# Patient Record
Sex: MALE | Race: WHITE | NOT HISPANIC OR LATINO | URBAN - METROPOLITAN AREA
[De-identification: names, ages, dates, MRNs, and addresses within clinical notes are randomized per-mention and may not be internally consistent; named-entity substitution may affect disease eponyms.]

---

## 2017-11-16 ENCOUNTER — OUTPATIENT (OUTPATIENT)
Dept: OUTPATIENT SERVICES | Facility: HOSPITAL | Age: 72
LOS: 1 days | Discharge: HOME | End: 2017-11-16

## 2017-11-16 DIAGNOSIS — Z79.01 LONG TERM (CURRENT) USE OF ANTICOAGULANTS: ICD-10-CM

## 2017-11-20 ENCOUNTER — OUTPATIENT (OUTPATIENT)
Dept: OUTPATIENT SERVICES | Facility: HOSPITAL | Age: 72
LOS: 1 days | Discharge: HOME | End: 2017-11-20

## 2017-11-20 DIAGNOSIS — Z79.01 LONG TERM (CURRENT) USE OF ANTICOAGULANTS: ICD-10-CM

## 2017-11-27 ENCOUNTER — OUTPATIENT (OUTPATIENT)
Dept: OUTPATIENT SERVICES | Facility: HOSPITAL | Age: 72
LOS: 1 days | Discharge: HOME | End: 2017-11-27

## 2017-11-27 DIAGNOSIS — Z79.01 LONG TERM (CURRENT) USE OF ANTICOAGULANTS: ICD-10-CM

## 2018-05-14 ENCOUNTER — OUTPATIENT (OUTPATIENT)
Dept: OUTPATIENT SERVICES | Facility: HOSPITAL | Age: 73
LOS: 1 days | Discharge: HOME | End: 2018-05-14

## 2018-05-14 DIAGNOSIS — I10 ESSENTIAL (PRIMARY) HYPERTENSION: ICD-10-CM

## 2018-06-03 ENCOUNTER — TRANSCRIPTION ENCOUNTER (OUTPATIENT)
Age: 73
End: 2018-06-03

## 2018-07-09 ENCOUNTER — OUTPATIENT (OUTPATIENT)
Dept: OUTPATIENT SERVICES | Facility: HOSPITAL | Age: 73
LOS: 1 days | Discharge: HOME | End: 2018-07-09

## 2018-07-09 DIAGNOSIS — E11.9 TYPE 2 DIABETES MELLITUS WITHOUT COMPLICATIONS: ICD-10-CM

## 2018-07-09 DIAGNOSIS — E78.3 HYPERCHYLOMICRONEMIA: ICD-10-CM

## 2018-07-09 DIAGNOSIS — I10 ESSENTIAL (PRIMARY) HYPERTENSION: ICD-10-CM

## 2022-07-19 ENCOUNTER — OUTPATIENT (OUTPATIENT)
Dept: OUTPATIENT SERVICES | Facility: HOSPITAL | Age: 77
LOS: 1 days | Discharge: HOME | End: 2022-07-19

## 2022-07-19 DIAGNOSIS — R07.9 CHEST PAIN, UNSPECIFIED: ICD-10-CM

## 2022-07-19 PROCEDURE — 75574 CT ANGIO HRT W/3D IMAGE: CPT | Mod: 26,MH

## 2022-08-11 ENCOUNTER — OUTPATIENT (OUTPATIENT)
Dept: OUTPATIENT SERVICES | Facility: HOSPITAL | Age: 77
LOS: 1 days | Discharge: HOME | End: 2022-08-11

## 2022-08-11 DIAGNOSIS — Z96.652 PRESENCE OF LEFT ARTIFICIAL KNEE JOINT: Chronic | ICD-10-CM

## 2022-08-11 DIAGNOSIS — Z98.49 CATARACT EXTRACTION STATUS, UNSPECIFIED EYE: Chronic | ICD-10-CM

## 2022-08-11 LAB
ANION GAP SERPL CALC-SCNC: 13 MMOL/L — SIGNIFICANT CHANGE UP (ref 7–14)
BUN SERPL-MCNC: 23 MG/DL — HIGH (ref 10–20)
CALCIUM SERPL-MCNC: 9.5 MG/DL — SIGNIFICANT CHANGE UP (ref 8.5–10.1)
CHLORIDE SERPL-SCNC: 108 MMOL/L — SIGNIFICANT CHANGE UP (ref 98–110)
CO2 SERPL-SCNC: 26 MMOL/L — SIGNIFICANT CHANGE UP (ref 17–32)
CREAT SERPL-MCNC: 1.4 MG/DL — SIGNIFICANT CHANGE UP (ref 0.7–1.5)
EGFR: 52 ML/MIN/1.73M2 — LOW
GLUCOSE SERPL-MCNC: 119 MG/DL — HIGH (ref 70–99)
HCT VFR BLD CALC: 41.4 % — LOW (ref 42–52)
HGB BLD-MCNC: 14.6 G/DL — SIGNIFICANT CHANGE UP (ref 14–18)
MCHC RBC-ENTMCNC: 33.6 PG — HIGH (ref 27–31)
MCHC RBC-ENTMCNC: 35.3 G/DL — SIGNIFICANT CHANGE UP (ref 32–37)
MCV RBC AUTO: 95.4 FL — HIGH (ref 80–94)
NRBC # BLD: 0 /100 WBCS — SIGNIFICANT CHANGE UP (ref 0–0)
PLATELET # BLD AUTO: 201 K/UL — SIGNIFICANT CHANGE UP (ref 130–400)
POTASSIUM SERPL-MCNC: 4.3 MMOL/L — SIGNIFICANT CHANGE UP (ref 3.5–5)
POTASSIUM SERPL-SCNC: 4.3 MMOL/L — SIGNIFICANT CHANGE UP (ref 3.5–5)
RBC # BLD: 4.34 M/UL — LOW (ref 4.7–6.1)
RBC # FLD: 12.7 % — SIGNIFICANT CHANGE UP (ref 11.5–14.5)
SODIUM SERPL-SCNC: 147 MMOL/L — HIGH (ref 135–146)
WBC # BLD: 4.31 K/UL — LOW (ref 4.8–10.8)
WBC # FLD AUTO: 4.31 K/UL — LOW (ref 4.8–10.8)

## 2022-08-11 PROCEDURE — 93458 L HRT ARTERY/VENTRICLE ANGIO: CPT | Mod: 26

## 2022-08-11 RX ORDER — METOPROLOL TARTRATE 50 MG
1 TABLET ORAL
Qty: 0 | Refills: 0 | DISCHARGE

## 2022-08-11 RX ORDER — ASPIRIN/CALCIUM CARB/MAGNESIUM 324 MG
1 TABLET ORAL
Qty: 0 | Refills: 0 | DISCHARGE

## 2022-08-11 RX ORDER — AMLODIPINE BESYLATE 2.5 MG/1
1 TABLET ORAL
Qty: 0 | Refills: 0 | DISCHARGE

## 2022-08-11 RX ORDER — FOSINOPRIL SODIUM 10 MG/1
1 TABLET ORAL
Qty: 0 | Refills: 0 | DISCHARGE

## 2022-08-11 RX ORDER — DUTASTERIDE AND TAMSULOSIN HYDROCHLORIDE CAPSULES .5; .4 MG/1; MG/1
1 CAPSULE ORAL
Qty: 0 | Refills: 0 | DISCHARGE

## 2022-08-11 RX ORDER — RANOLAZINE 500 MG/1
1 TABLET, FILM COATED, EXTENDED RELEASE ORAL
Qty: 0 | Refills: 0 | DISCHARGE

## 2022-08-11 NOTE — H&P CARDIOLOGY - NSICDXFAMILYHX_GEN_ALL_CORE_FT
FAMILY HISTORY:  Father  Still living? Unknown  FH: myocardial infarction, Age at diagnosis: Age Unknown    Grandparent  Still living? Unknown  FH: myocardial infarction, Age at diagnosis: Age Unknown

## 2022-08-11 NOTE — CHART NOTE - NSCHARTNOTEFT_GEN_A_CORE
PRE-OP DIAGNOSIS:    Stable Angina. Abnormal CCTA    PROCEDURE:     [x] Coronary Angiogram     [x] LHC     [] LVG     [] RHC     [] Intervention (see below)         PHYSICIAN:  Dr Mello    ASSISTANT:  Dr BELLA Rashid       PROCEDURE DESCRIPTION:     Consent:      [x] Patient     [] Family Member     []  Used        Anesthesia:     [] General     [x] Sedation     [x] Local        Access & Closure:     [x] 6 Fr Right Radial Artery (D stat closure)    [] Fr Femoral Artery     [] Fr Femoral Vein     [] Fr Brachial Vein       IV Contrast: 50 mL        Intervention: None      Implants: None       FINDINGS:     The coronary circulation is right dominant    Left main: minor luminal irregularities    LAD: Prox LAD 30 % stenosis   D1: minor luminal irregularities    Cx: minor luminal irregularities  OM3: 40 % stenosis     RCA: minor luminal irregularities  RPL: showed minor luminal irregularities with no flow limiting lesions. 2nd posterolateral RPL2 40 % stenosis     LVEDP: 4 mmHg      ESTIMATED BLOOD LOSS: < 10 mL        CONDITION:     [x] Good     [] Fair     [] Critical        SPECIMEN REMOVED: N/A       POST-OP DIAGNOSIS:  Non obstructive CAD     PLAN OF CARE:     [x] D/C Home Today     [x] Medications: aspirin, metoprolol and lipitor    [x] IV Fluids: 150cc/hr for 2 hours. PRE-OP DIAGNOSIS:    Stable Angina. Abnormal CCTA    PROCEDURE:     [x] Coronary Angiogram     [x] LHC     [] LVG     [] RHC     [] Intervention (see below)         PHYSICIAN:  Dr Mello    ASSISTANT:  Dr BELLA Rashid       PROCEDURE DESCRIPTION:     Consent:      [x] Patient     [] Family Member     []  Used        Anesthesia:     [] General     [x] Sedation     [x] Local        Access & Closure:     [x] 6 Fr Right Radial Artery (D stat closure)    [] Fr Femoral Artery     [] Fr Femoral Vein     [] Fr Brachial Vein       IV Contrast: 50 mL        Intervention: None      Implants: None       FINDINGS:     The coronary circulation is right dominant    Left main: minor luminal irregularities    LAD: Prox LAD 30 % stenosis   D1: minor luminal irregularities    Cx: minor luminal irregularities  OM3: 40 % stenosis     RCA: minor luminal irregularities  RPL: showed minor luminal irregularities with no flow limiting lesions. 2nd posterolateral RPL2 40 % stenosis     LVEDP: 4 mmHg      ESTIMATED BLOOD LOSS: < 10 mL        CONDITION:     [x] Good     [] Fair     [] Critical        SPECIMEN REMOVED: N/A       POST-OP DIAGNOSIS:  Non obstructive CAD     PLAN OF CARE:     [x] D/C Home Today, card f/up 2-3 weeks    [x] Medications: aspirin, metoprolol and lipitor    [x] IV Fluids: 150cc/hr for 2 hours.

## 2022-08-11 NOTE — H&P CARDIOLOGY - HISTORY OF PRESENT ILLNESS
Patient is a 77y Male PMH: HTN, Covid 1/22, hemachromatosis, ex-smoker, + FH CAD, obesity                                 PSH: b/l hip rplcmt, b/l knee rplcmt, b/l cataract    Pt reports episodes of SOB with ambulation over past 3 months, pt had CCTA revealing ca score 636, multivessel ds, pLCX severe stenosis and moderate ostial LAD disease, [facility] recommended                             Vital Signs Last 24 Hrs  T(C): --  T(F): --  HR: --  BP: --173/82  BP(mean): --118  RR: --  SpO2: --        Pre cath note:  indication:  [ ] STEMI                [ ] NSTEMI                 [ ] Acute coronary syndrome                   [ ]Unstable Angina   [ ] high risk  [ ] intermediate risk  [ ] low risk                   [x ] Stable Angina     non-invasive testing:           CCTA               Date:     7/19/22                result: [x ] high risk  [ ] intermediate risk  [ ] low risk    Anti- Anginal medications:                    [ ] not used                       [ x] used                   [ ] not used but strong indication not to use    Ejection Fraction                   [ ] <29            [ ] 30-39%   [x ] 40-49%     [ ]>50%    CHF                   [ ] active (within last 14 days on meds   [x ] Chronic (on meds but no exacerbation)    COPD                   [ ] mild (on chronic bronchodilators)  [ ] moderate (on chronic steroid therapy)      [ ] severe (indication for home O2 or PACO2 >50)    Other risk factors:                     [ ] Previous MI                     [ ] CVA/ stroke                    [ ] carotid stent/ CEA                    [ ] PVD/PAD- (arterial aneurysm, non-palpable pulses, tortuous vessel with inability to insert catheter, infra-renal dissection, renal or subclavian artery stenosis)                    [ ] diabetic                    [ ] previous CABG                    [ ] Renal Failure     Bleeding Risk: 1.7%    REVIEW OF SYSTEMS:  CONSTITUTIONAL: No fever, weight loss, or fatigue  CARDIOLOGY:no chest pain   RESPIRATORY: intermittent SOB with ambulation   NEUROLOGICAL: NO weakness, no focal deficits to report.  GI: no BRBPR, no N,V,diarrhea.     PHYSICAL EXAM:  · CONSTITUTIONAL:	Well-developed, well nourished    ·RESPIRATORY:   decreased b/l  · CARDIOVASCULAR	regular rate and rhythm  no rub  no murmur  normal PMI  · EXTREMITIES: No cyanosis, clubbing or edema  · VASCULAR: 	Equal and normal pulses (carotid, femoral, dorsalis pedis)  	  R ciera test WNL      EKG: SR  EF: 45%

## 2022-08-11 NOTE — ASU PATIENT PROFILE, ADULT - FALL HARM RISK - UNIVERSAL INTERVENTIONS
Bed in lowest position, wheels locked, appropriate side rails in place/Call bell, personal items and telephone in reach/Instruct patient to call for assistance before getting out of bed or chair/Non-slip footwear when patient is out of bed/[place] to call system/Physically safe environment - no spills, clutter or unnecessary equipment/Purposeful Proactive Rounding/Room/bathroom lighting operational, light cord in reach

## 2022-08-11 NOTE — H&P CARDIOLOGY - NSICDXPASTSURGICALHX_GEN_ALL_CORE_FT
Patient returned call. Patient notified of results. Patient verbalized understanding.    PAST SURGICAL HISTORY:  History of cataract surgery b/l    History of left knee replacement b/l knee and hip replacement

## 2022-08-15 PROBLEM — I10 ESSENTIAL (PRIMARY) HYPERTENSION: Chronic | Status: ACTIVE | Noted: 2022-08-11

## 2022-08-15 PROBLEM — U07.1 COVID-19: Chronic | Status: ACTIVE | Noted: 2022-08-11

## 2022-08-16 DIAGNOSIS — I20.8 OTHER FORMS OF ANGINA PECTORIS: ICD-10-CM

## 2022-08-16 DIAGNOSIS — I25.118 ATHEROSCLEROTIC HEART DISEASE OF NATIVE CORONARY ARTERY WITH OTHER FORMS OF ANGINA PECTORIS: ICD-10-CM

## 2022-08-16 DIAGNOSIS — I10 ESSENTIAL (PRIMARY) HYPERTENSION: ICD-10-CM

## 2022-08-16 DIAGNOSIS — Z87.891 PERSONAL HISTORY OF NICOTINE DEPENDENCE: ICD-10-CM

## 2022-08-16 DIAGNOSIS — Z96.643 PRESENCE OF ARTIFICIAL HIP JOINT, BILATERAL: ICD-10-CM

## 2022-08-16 DIAGNOSIS — Z96.653 PRESENCE OF ARTIFICIAL KNEE JOINT, BILATERAL: ICD-10-CM

## 2022-10-17 ENCOUNTER — APPOINTMENT (OUTPATIENT)
Dept: CARDIOLOGY | Facility: CLINIC | Age: 77
End: 2022-10-17

## 2022-10-17 ENCOUNTER — TRANSCRIPTION ENCOUNTER (OUTPATIENT)
Age: 77
End: 2022-10-17

## 2022-10-17 VITALS — HEIGHT: 72 IN | WEIGHT: 266.19 LBS | BODY MASS INDEX: 36.05 KG/M2

## 2022-10-17 VITALS — HEART RATE: 70 BPM | DIASTOLIC BLOOD PRESSURE: 80 MMHG | SYSTOLIC BLOOD PRESSURE: 150 MMHG

## 2022-10-17 DIAGNOSIS — Z82.49 FAMILY HISTORY OF ISCHEMIC HEART DISEASE AND OTHER DISEASES OF THE CIRCULATORY SYSTEM: ICD-10-CM

## 2022-10-17 PROCEDURE — 99214 OFFICE O/P EST MOD 30 MIN: CPT

## 2022-10-17 RX ORDER — TRAVOPROST 0.04 MG/ML
0 SOLUTION/ DROPS OPHTHALMIC
Refills: 0 | Status: ACTIVE | COMMUNITY

## 2022-10-17 RX ORDER — DUTASTERIDE AND TAMSULOSIN HYDROCHLORIDE .5; .4 MG/1; MG/1
0.5-0.4 CAPSULE ORAL
Refills: 0 | Status: ACTIVE | COMMUNITY

## 2022-10-17 RX ORDER — RANOLAZINE 500 MG/1
500 TABLET, EXTENDED RELEASE ORAL
Qty: 60 | Refills: 0 | Status: DISCONTINUED | COMMUNITY
Start: 2022-08-01 | End: 2022-10-17

## 2022-10-17 RX ORDER — FOSINOPRIL SODIUM 20 MG/1
20 TABLET ORAL
Refills: 0 | Status: DISCONTINUED | COMMUNITY
End: 2022-10-17

## 2022-10-17 NOTE — HISTORY OF PRESENT ILLNESS
[FreeTextEntry1] : PT WITH HTN, FAMILY H/O CAD, OBESITY, LVEF 45% IN 2017, NONOBSTRUCTIVE CAD CATH 8/22, ELEVATED CALCIUM SCORE, HEMOCHROMASTOSIS, DD1 \par \par CTA 7/22: CAC: 636 AND SEVERE STENOSIS LAD \par CATH 8/22: NONOBSTRUCTIVE CAD SO CTA INACCURATE. \par \par pt sob with minimal walking and nonobstructive cad attempted to stop ranexa. \par \par \par pt stopped ranexa no issues, bp high, will start statin

## 2022-10-17 NOTE — PHYSICAL EXAM
[Normal S1, S2] : normal S1, S2 [Clear Lung Fields] : clear lung fields [Soft] : abdomen soft [de-identified] : rrr

## 2023-01-02 ENCOUNTER — NON-APPOINTMENT (OUTPATIENT)
Age: 78
End: 2023-01-02

## 2023-01-06 ENCOUNTER — APPOINTMENT (OUTPATIENT)
Dept: CARDIOLOGY | Facility: CLINIC | Age: 78
End: 2023-01-06
Payer: MEDICARE

## 2023-01-06 PROCEDURE — 93306 TTE W/DOPPLER COMPLETE: CPT

## 2023-01-17 ENCOUNTER — APPOINTMENT (OUTPATIENT)
Dept: CARDIOLOGY | Facility: CLINIC | Age: 78
End: 2023-01-17
Payer: MEDICARE

## 2023-01-17 VITALS — WEIGHT: 273.31 LBS | HEIGHT: 72 IN | BODY MASS INDEX: 37.02 KG/M2

## 2023-01-17 PROCEDURE — 99214 OFFICE O/P EST MOD 30 MIN: CPT

## 2023-03-20 ENCOUNTER — APPOINTMENT (OUTPATIENT)
Dept: CARDIOLOGY | Facility: CLINIC | Age: 78
End: 2023-03-20
Payer: MEDICARE

## 2023-03-20 VITALS — DIASTOLIC BLOOD PRESSURE: 80 MMHG | SYSTOLIC BLOOD PRESSURE: 130 MMHG | HEART RATE: 70 BPM

## 2023-03-20 VITALS — BODY MASS INDEX: 37.19 KG/M2 | HEIGHT: 72 IN | WEIGHT: 274.56 LBS

## 2023-03-20 PROCEDURE — 99214 OFFICE O/P EST MOD 30 MIN: CPT

## 2023-03-20 NOTE — HISTORY OF PRESENT ILLNESS
[FreeTextEntry1] : PT WITH HTN, FAMILY H/O CAD, OBESITY, LVEF 45% IN 2017, NONOBSTRUCTIVE CAD CATH 8/22, ELEVATED CALCIUM SCORE, HEMOCHROMASTOSIS, DD1, LVEF 45%, DD2, LVH, LVMI: 158 g/m2 \par \par CTA 7/22: CAC: 636 AND SEVERE STENOSIS LAD \par CATH 8/22: NONOBSTRUCTIVE CAD SO CTA INACCURATE. \par \par pt sob with minimal walking and nonobstructive cad attempted to stop ranexa. \par \par pt stopped ranexa no issues, bp high, will start statin \par \par 1/17/23: pt here for f/u 2  decho and bp, pt c/o's of bp elevated at times at dr. office but not at home, bp in office 130/80 today and tolerating statin and valsartan \par LDL 55 now reviewed bloodwork \par echo as listed below. \par \par 3/20/23:\par lipoprotein a: 142\par apo b: 57\par not all bloodwork is back. pt with intermittent sob and walking a block prior to sob. \par 1/6/23: LVEF 45-50%, DILATED LV 7 cm, E/e': 12 LVH, LVMI: 158 g/m2 RICARDO

## 2023-03-20 NOTE — DISCUSSION/SUMMARY
[FreeTextEntry1] : pt with echo RICARDO and LVH, dilated lv, mild lv dysfunction \par get bloodwork for AL \par will f/u in 2 months \par check bp at home bring cuff. \par ? due to hemochromatosis \par get hem/onc name \par \par 3/20/23: \par stop HCTZ \par start furosemide 40 mg po qd as nyha class 1 to 2 \par bloodwork

## 2023-03-20 NOTE — PHYSICAL EXAM
[Normal S1, S2] : normal S1, S2 [Clear Lung Fields] : clear lung fields [Soft] : abdomen soft [de-identified] : rrr

## 2023-03-20 NOTE — PHYSICAL EXAM
[Normal S1, S2] : normal S1, S2 [Clear Lung Fields] : clear lung fields [Soft] : abdomen soft [de-identified] : rrr

## 2023-03-20 NOTE — HISTORY OF PRESENT ILLNESS
[FreeTextEntry1] : PT WITH HTN, FAMILY H/O CAD, OBESITY, LVEF 45% IN 2017, NONOBSTRUCTIVE CAD CATH 8/22, ELEVATED CALCIUM SCORE, HEMOCHROMASTOSIS, DD1, LVEF 45%, DD2, LVH, LVMI: 158 g/m2 \par \par CTA 7/22: CAC: 636 AND SEVERE STENOSIS LAD \par CATH 8/22: NONOBSTRUCTIVE CAD SO CTA INACCURATE. \par \par pt sob with minimal walking and nonobstructive cad attempted to stop ranexa. \par \par pt stopped ranexa no issues, bp high, will start statin \par \par 1/17/23: pt here for f/u 2  decho and bp, pt c/o's of bp elevated at times at dr. office but not at home, bp in office 130/80 today and tolerating statin and valsartan \par LDL 55 now reviewed bloodwork \par echo as listed below.

## 2023-03-20 NOTE — DISCUSSION/SUMMARY
[FreeTextEntry1] : pt with echo RICARDO and LVH, dilated lv, mild lv dysfunction \par get bloodwork for AL \par will f/u in 2 months \par check bp at home bring cuff. \par ? due to hemochromatosis \par get hem/onc name

## 2023-05-01 ENCOUNTER — APPOINTMENT (OUTPATIENT)
Dept: CARDIOLOGY | Facility: CLINIC | Age: 78
End: 2023-05-01
Payer: MEDICARE

## 2023-05-01 VITALS — BODY MASS INDEX: 37.31 KG/M2 | WEIGHT: 275.5 LBS | HEIGHT: 72 IN

## 2023-05-01 PROCEDURE — 99214 OFFICE O/P EST MOD 30 MIN: CPT

## 2023-05-01 RX ORDER — LEVOTHYROXINE SODIUM 0.05 MG/1
50 TABLET ORAL DAILY
Qty: 90 | Refills: 3 | Status: ACTIVE | COMMUNITY
Start: 2023-05-01 | End: 1900-01-01

## 2023-05-01 NOTE — PHYSICAL EXAM
[Normal S1, S2] : normal S1, S2 [Clear Lung Fields] : clear lung fields [Soft] : abdomen soft [de-identified] : rrr

## 2023-05-01 NOTE — HISTORY OF PRESENT ILLNESS
[FreeTextEntry1] : PT WITH HTN, FAMILY H/O CAD, OBESITY, LVEF 45% IN 2017, NONOBSTRUCTIVE CAD CATH 8/22, ELEVATED CALCIUM SCORE, HEMOCHROMASTOSIS, DD1, LVEF 45%, DD2, LVH, LVMI: 158 g/m2, HIGH LPa hypothyroid \par \par CTA 7/22: CAC: 636 AND SEVERE STENOSIS LAD \par CATH 8/22: NONOBSTRUCTIVE CAD SO CTA INACCURATE. \par \par 1/17/23: pt here for f/u 2  decho and bp, pt c/o's of bp elevated at times at dr. office but not at home, bp in office 130/80 today and tolerating statin and valsartan \par LDL 55 now reviewed bloodwork \par \par 3/20/23:\par lipoprotein a: 142\par apo b: 57\par not all bloodwork is back. pt with intermittent sob and walking a block prior to sob. \par 1/6/23: LVEF 45-50%, DILATED LV 7 cm, E/e': 12 LVH, LVMI: 158 g/m2 RICARDO \par \par 5/1/23: \par bnp: 46 gfr: 62 K:4.4 A1c: 5.8  TSH: 10.3 \par pt says sob after walking less than a block and patien tired more now. pt edema is improved. \par pt to start synthroid and f/u with PMD.

## 2023-05-01 NOTE — DISCUSSION/SUMMARY
[FreeTextEntry1] : pt with echo RICARDO and LVH, dilated lv, mild lv dysfunction \par get bloodwork for AL \par will f/u in 2 months \par check bp at home bring cuff. \par ? due to hemochromatosis \par get hem/onc name \par \par 3/20/23: \par stop HCTZ \par start furosemide 40 mg po qd as nyha class 1 to 2 \par \par 5/1/23: \par start synthroid 50 mcg po qd \par get bloodwork \par f/u in 1 months

## 2023-07-31 ENCOUNTER — APPOINTMENT (OUTPATIENT)
Dept: CARDIOLOGY | Facility: CLINIC | Age: 78
End: 2023-07-31
Payer: MEDICARE

## 2023-07-31 VITALS — BODY MASS INDEX: 36.05 KG/M2 | HEIGHT: 73 IN | WEIGHT: 272 LBS

## 2023-07-31 VITALS — SYSTOLIC BLOOD PRESSURE: 128 MMHG | HEART RATE: 70 BPM | DIASTOLIC BLOOD PRESSURE: 70 MMHG

## 2023-07-31 DIAGNOSIS — E03.9 HYPOTHYROIDISM, UNSPECIFIED: ICD-10-CM

## 2023-07-31 PROCEDURE — 99214 OFFICE O/P EST MOD 30 MIN: CPT

## 2023-07-31 RX ORDER — HYDROCHLOROTHIAZIDE 12.5 MG/1
12.5 CAPSULE ORAL
Qty: 90 | Refills: 0 | Status: DISCONTINUED | COMMUNITY
Start: 2021-12-28 | End: 2023-07-31

## 2023-07-31 RX ORDER — HYDROCHLOROTHIAZIDE 12.5 MG/1
12.5 TABLET ORAL DAILY
Qty: 90 | Refills: 3 | Status: DISCONTINUED | COMMUNITY
End: 2023-07-31

## 2023-07-31 NOTE — DISCUSSION/SUMMARY
[FreeTextEntry1] : pt with echo RICARDO and LVH, dilated lv, mild lv dysfunction  ? due to hemochromatosis  continue furosemide 40 mg po qd as nyha class 1 to 2  continue synthroid 50 mcg po qd  get blood work  f/u in 4 months  get carotid

## 2023-07-31 NOTE — HISTORY OF PRESENT ILLNESS
[FreeTextEntry1] : PT WITH HTN, FAMILY H/O CAD, OBESITY, LVEF 45% IN 2017, NONOBSTRUCTIVE CAD CATH , ELEVATED CALCIUM SCORE 636, HEMOCHROMASTOSIS, DD1, LVEF 45%, DD2, LVH, LVMI: 158 g/m2, HIGH LPa hypothyroid, LVH   CTA : CAC: 636 AND SEVERE STENOSIS LAD  CATH : NONOBSTRUCTIVE CAD SO CTA WAS INACCURATE.   23 LDL 55 now reviewed bloodwork   3/20/23: lipoprotein a: 142 apo b: 57 not all bloodwork is back. pt with intermittent sob and walking a block prior to sob.  23: LVEF 45-50%, DILATED LV 7 cm, E/e': 12 LVH, LVMI: 158 g/m2 RICARDO   23:  bnp: 46 gfr: 62 K:4.4 A1c: 5.8  TSH: 10.3  pt says sob after walking less than a block and patient tired more now. pt edema is improved.  pt to start synthroid and f/u with PMD.   23: HDL: 42, T, LDL: 56, A1C: 5.9, BNP: 18 TSH improved to 3.95. pt on lasix 40 mg and hctz was stopped.  pt sob after walking less than a block and stops for a 30 seconds to a minute and resumes.  pt not very active and a1c worsened.  pt no cp and sob restricts a little bit.

## 2023-07-31 NOTE — PHYSICAL EXAM
[Normal S1, S2] : normal S1, S2 [Clear Lung Fields] : clear lung fields [Soft] : abdomen soft [Normal Venous Pressure] : normal venous pressure [No Edema] : no edema [de-identified] : rrr

## 2023-10-16 ENCOUNTER — RX RENEWAL (OUTPATIENT)
Age: 78
End: 2023-10-16

## 2023-10-16 RX ORDER — ATORVASTATIN CALCIUM 10 MG/1
10 TABLET, FILM COATED ORAL
Qty: 90 | Refills: 3 | Status: ACTIVE | COMMUNITY
Start: 2022-10-17 | End: 1900-01-01

## 2023-10-31 ENCOUNTER — RX RENEWAL (OUTPATIENT)
Age: 78
End: 2023-10-31

## 2023-10-31 RX ORDER — VALSARTAN 160 MG/1
160 TABLET, COATED ORAL DAILY
Qty: 90 | Refills: 3 | Status: ACTIVE | COMMUNITY
Start: 2022-10-17 | End: 1900-01-01

## 2023-11-13 ENCOUNTER — APPOINTMENT (OUTPATIENT)
Dept: CARDIOLOGY | Facility: CLINIC | Age: 78
End: 2023-11-13
Payer: MEDICARE

## 2023-11-13 PROCEDURE — 93880 EXTRACRANIAL BILAT STUDY: CPT

## 2023-11-29 ENCOUNTER — APPOINTMENT (OUTPATIENT)
Dept: CARDIOLOGY | Facility: CLINIC | Age: 78
End: 2023-11-29
Payer: MEDICARE

## 2023-11-29 VITALS — BODY MASS INDEX: 36.45 KG/M2 | HEIGHT: 73 IN | WEIGHT: 275 LBS

## 2023-11-29 VITALS — SYSTOLIC BLOOD PRESSURE: 120 MMHG | DIASTOLIC BLOOD PRESSURE: 80 MMHG

## 2023-11-29 DIAGNOSIS — R94.31 ABNORMAL ELECTROCARDIOGRAM [ECG] [EKG]: ICD-10-CM

## 2023-11-29 DIAGNOSIS — E66.8 OTHER OBESITY: ICD-10-CM

## 2023-11-29 DIAGNOSIS — Z00.00 ENCOUNTER FOR GENERAL ADULT MEDICAL EXAMINATION W/OUT ABNORMAL FINDINGS: ICD-10-CM

## 2023-11-29 PROCEDURE — 99214 OFFICE O/P EST MOD 30 MIN: CPT

## 2023-11-29 RX ORDER — LEVOTHYROXINE SODIUM 0.07 MG/1
75 TABLET ORAL DAILY
Qty: 90 | Refills: 3 | Status: ACTIVE | COMMUNITY
Start: 2023-11-29 | End: 1900-01-01

## 2024-03-18 ENCOUNTER — APPOINTMENT (OUTPATIENT)
Dept: CARDIOLOGY | Facility: CLINIC | Age: 79
End: 2024-03-18
Payer: MEDICARE

## 2024-03-18 ENCOUNTER — RX RENEWAL (OUTPATIENT)
Age: 79
End: 2024-03-18

## 2024-03-18 LAB
ALBUMIN SERPL ELPH-MCNC: 4.5 G/DL
ALP BLD-CCNC: 66 U/L
ALT SERPL-CCNC: 29 U/L
ANION GAP SERPL CALC-SCNC: 12 MMOL/L
AST SERPL-CCNC: 27 U/L
BILIRUB SERPL-MCNC: 0.6 MG/DL
BUN SERPL-MCNC: 20 MG/DL
CALCIUM SERPL-MCNC: 9.3 MG/DL
CHLORIDE SERPL-SCNC: 106 MMOL/L
CHOLEST SERPL-MCNC: 124 MG/DL
CO2 SERPL-SCNC: 21 MMOL/L
CREAT SERPL-MCNC: 1.3 MG/DL
EGFR: 56 ML/MIN/1.73M2
ESTIMATED AVERAGE GLUCOSE: 140 MG/DL
GLUCOSE SERPL-MCNC: 141 MG/DL
HBA1C MFR BLD HPLC: 6.5 %
HCT VFR BLD CALC: 43.9 %
HDLC SERPL-MCNC: 37 MG/DL
HGB BLD-MCNC: 15.1 G/DL
LDLC SERPL CALC-MCNC: 56 MG/DL
MCHC RBC-ENTMCNC: 33.3 PG
MCHC RBC-ENTMCNC: 34.4 G/DL
MCV RBC AUTO: 96.9 FL
NONHDLC SERPL-MCNC: 87 MG/DL
NT-PROBNP SERPL-MCNC: 47 PG/ML
PLATELET # BLD AUTO: 205 K/UL
PMV BLD AUTO: 0 /100 WBCS
PMV BLD: 12 FL
POTASSIUM SERPL-SCNC: 4.2 MMOL/L
PROT SERPL-MCNC: 6.9 G/DL
RBC # BLD: 4.53 M/UL
RBC # FLD: 12.5 %
SODIUM SERPL-SCNC: 139 MMOL/L
TRIGL SERPL-MCNC: 155 MG/DL
TSH SERPL-ACNC: 5.43 UIU/ML
WBC # FLD AUTO: 4.9 K/UL

## 2024-03-18 PROCEDURE — 93306 TTE W/DOPPLER COMPLETE: CPT

## 2024-03-18 RX ORDER — FUROSEMIDE 40 MG/1
40 TABLET ORAL DAILY
Qty: 90 | Refills: 3 | Status: ACTIVE | COMMUNITY
Start: 2023-03-20 | End: 1900-01-01

## 2024-04-01 ENCOUNTER — RX RENEWAL (OUTPATIENT)
Age: 79
End: 2024-04-01

## 2024-04-01 RX ORDER — AMLODIPINE BESYLATE 5 MG/1
5 TABLET ORAL DAILY
Qty: 90 | Refills: 3 | Status: ACTIVE | COMMUNITY
Start: 1900-01-01 | End: 1900-01-01

## 2024-04-22 ENCOUNTER — APPOINTMENT (OUTPATIENT)
Dept: CARDIOLOGY | Facility: CLINIC | Age: 79
End: 2024-04-22
Payer: MEDICARE

## 2024-04-22 VITALS — DIASTOLIC BLOOD PRESSURE: 80 MMHG | HEART RATE: 76 BPM | SYSTOLIC BLOOD PRESSURE: 142 MMHG

## 2024-04-22 PROCEDURE — G2211 COMPLEX E/M VISIT ADD ON: CPT

## 2024-04-22 PROCEDURE — 99214 OFFICE O/P EST MOD 30 MIN: CPT

## 2024-04-22 PROCEDURE — 93000 ELECTROCARDIOGRAM COMPLETE: CPT

## 2024-04-22 RX ORDER — EMPAGLIFLOZIN 10 MG/1
10 TABLET, FILM COATED ORAL DAILY
Qty: 90 | Refills: 3 | Status: ACTIVE | COMMUNITY
Start: 2024-04-22 | End: 1900-01-01

## 2024-06-13 LAB
ALBUMIN SERPL ELPH-MCNC: 4.5 G/DL
ALP BLD-CCNC: 67 U/L
ALT SERPL-CCNC: 28 U/L
ANION GAP SERPL CALC-SCNC: 14 MMOL/L
AST SERPL-CCNC: 33 U/L
BILIRUB SERPL-MCNC: 0.6 MG/DL
BUN SERPL-MCNC: 24 MG/DL
CALCIUM SERPL-MCNC: 9.5 MG/DL
CHLORIDE SERPL-SCNC: 106 MMOL/L
CHOLEST SERPL-MCNC: 108 MG/DL
CO2 SERPL-SCNC: 21 MMOL/L
CREAT SERPL-MCNC: 1.24 MG/DL
EGFR: 60 ML/MIN/1.73M2
ESTIMATED AVERAGE GLUCOSE: 123 MG/DL
GLUCOSE SERPL-MCNC: 106 MG/DL
HBA1C MFR BLD HPLC: 5.9 %
HCT VFR BLD CALC: 47.6 %
HDLC SERPL-MCNC: 37 MG/DL
HGB BLD-MCNC: 15.6 G/DL
LDLC SERPL CALC-MCNC: 47 MG/DL
MCHC RBC-ENTMCNC: 32.8 GM/DL
MCHC RBC-ENTMCNC: 33.8 PG
MCV RBC AUTO: 103.3 FL
NONHDLC SERPL-MCNC: 71 MG/DL
NT-PROBNP SERPL-MCNC: 46 PG/ML
PLATELET # BLD AUTO: 206 K/UL
POTASSIUM SERPL-SCNC: 4.3 MMOL/L
PROT SERPL-MCNC: 7 G/DL
RBC # BLD: 4.61 M/UL
RBC # FLD: 13.3 %
SODIUM SERPL-SCNC: 141 MMOL/L
TRIGL SERPL-MCNC: 138 MG/DL
TSH SERPL-ACNC: 2.64 UIU/ML
WBC # FLD AUTO: 4.55 K/UL

## 2024-06-13 NOTE — HISTORY OF PRESENT ILLNESS
[FreeTextEntry1] : PT with HTN, NONOBSTRUCTIVE CAD CATH , ELEVATED CALCIUM SCORE 636, HEMOCHROMASTOSIS,  LVEF 45%, DD2, LVH, LVMI: 158 g/m2, HIGH LPa, LVH FAMILY H/O CAD, OBESITY,  hypothyroid  CTA : CAC: 636 AND SEVERE STENOSIS LAD  CATH : NONOBSTRUCTIVE CAD SO CTA WAS INACCURATE.   23 LDL 55 now reviewed bloodwork   3/20/23: lipoprotein a: 142 apo b: 57 not all bloodwork is back. pt with intermittent sob and walking a block prior to sob.  23: LVEF 45-50%, DILATED LV 7 cm, E/e': 12 LVH, LVMI: 158 g/m2 RICARDO   23:  bnp: 46 gfr: 62 K:4.4 A1c: 5.8  TSH: 10.3  pt says sob after walking less than a block and patient tired more now. pt edema is improved.  pt to start synthroid and f/u with PMD.   23: HDL: 42, T, LDL: 56, A1C: 5.9, BNP: 18 TSH improved to 3.95. pt on lasix 40 mg and hctz was stopped.  pt sob after walking less than a block and stops for a 30 seconds to a minute and resumes.  pt not very active and a1c worsened.  pt no cp and sob restricts a little bit.   23:  23: Carotid: b/l < 50%. Mild atherosclerosis intraluminal b/l ica. HDL:38, T, LDL: 46, A1C: 5.9, BNP: 60,  pt says sob is imrpoved vs prior TSH: now 8.27   24: 3/13/24: A1C: 6.5, TSH: 5.43, T, HDL: 37, LDL: 56, GFR: 56, BNP: 47 3/18/24: EF: 46%, DD1, mild LAE, mild-mod MR, mod LVH LVMI 138 g/m2 from 158 g/m2  lvh improved vs past.  Patient states did not take w  24: 24: BUN/CR: .24, GFR: 60, BNP: 46, HDL: 37, T, LDL: 47, A1C: 5.9

## 2024-06-13 NOTE — DISCUSSION/SUMMARY
[FreeTextEntry1] : pt with LVH, dilated lv, mild lv dysfunction lvh improving in 2024  ? due to hemochromatosis  continue furosemide 40 mg po qd as nyha class 2  continue synthroid 50 mcg po qd  consider pulmonary evaluation.  cont atorvastatin 10 mg po qhs  cont metoprolol er 25 qd  cont amlodipine 5 mg daily  cont valsartan 160 qd  on rebeca  increase levothyroxine to 75 mcg daily f/u with pmd  start Jardiance  bloood work 2 months

## 2024-06-24 ENCOUNTER — APPOINTMENT (OUTPATIENT)
Dept: CARDIOLOGY | Facility: CLINIC | Age: 79
End: 2024-06-24
Payer: MEDICARE

## 2024-06-24 VITALS — HEIGHT: 73 IN | BODY MASS INDEX: 35.12 KG/M2 | WEIGHT: 265 LBS

## 2024-06-24 DIAGNOSIS — I50.22 CHRONIC SYSTOLIC (CONGESTIVE) HEART FAILURE: ICD-10-CM

## 2024-06-24 DIAGNOSIS — I25.10 ATHEROSCLEROTIC HEART DISEASE OF NATIVE CORONARY ARTERY W/OUT ANGINA PECTORIS: ICD-10-CM

## 2024-06-24 DIAGNOSIS — I10 ESSENTIAL (PRIMARY) HYPERTENSION: ICD-10-CM

## 2024-06-24 DIAGNOSIS — E83.119 HEMOCHROMATOSIS, UNSPECIFIED: ICD-10-CM

## 2024-06-24 DIAGNOSIS — I65.29 OCCLUSION AND STENOSIS OF UNSPECIFIED CAROTID ARTERY: ICD-10-CM

## 2024-06-24 DIAGNOSIS — R93.1 ABNORMAL FINDINGS ON DIAGNOSTIC IMAGING OF HEART AND CORONARY CIRCULATION: ICD-10-CM

## 2024-06-24 DIAGNOSIS — I51.9 HEART DISEASE, UNSPECIFIED: ICD-10-CM

## 2024-06-24 PROCEDURE — G2211 COMPLEX E/M VISIT ADD ON: CPT

## 2024-06-24 PROCEDURE — 99214 OFFICE O/P EST MOD 30 MIN: CPT

## 2024-06-24 NOTE — HISTORY OF PRESENT ILLNESS
[FreeTextEntry1] : PT with HTN, NONOBSTRUCTIVE CAD CATH , ELEVATED CALCIUM SCORE 636,   LVEF 45%, DD2, LVH, LVMI: 158 g/m2, HIGH LPa, LVH FAMILY H/O CAD, OBESITY, hypothyroid, HEMOCHROMASTOSIS,with phlebotomy q 3 months.   CTA : CAC: 636 AND SEVERE STENOSIS LAD  CATH : NONOBSTRUCTIVE CAD SO CTA WAS INACCURATE.   23 LDL 55 now reviewed bloodwork   3/20/23: lipoprotein a: 142 apo b: 57 not all bloodwork is back. pt with intermittent sob and walking a block prior to sob.  23: LVEF 45-50%, DILATED LV 7 cm, E/e': 12 LVH, LVMI: 158 g/m2 RICARDO   23:  bnp: 46 gfr: 62 K:4.4 A1c: 5.8  TSH: 10.3  pt says sob after walking less than a block and patient tired more now. pt edema is improved.  pt to start synthroid and f/u with PMD.   23: HDL: 42, T, LDL: 56, A1C: 5.9, BNP: 18 TSH improved to 3.95. pt on lasix 40 mg and hctz was stopped.  pt sob after walking less than a block and stops for a 30 seconds to a minute and resumes.  pt not very active and a1c worsened.  pt no cp and sob restricts a little bit.   23:  23: Carotid: b/l < 50%. Mild atherosclerosis intraluminal b/l ica. HDL:38, T, LDL: 46, A1C: 5.9, BNP: 60,  pt says sob is imrpoved vs prior TSH: now 8.27   24: 3/13/24: A1C: 6.5, TSH: 5.43, T, HDL: 37, LDL: 56, GFR: 56, BNP: 47 3/18/24: EF: 46%, DD1, mild LAE, mild-mod MR, mod LVH LVMI 138 g/m2 from 158 g/m2  lvh improved vs past.   24: 24: BUN/CR: ., GFR: 60, BNP: 46, HDL: 37, T, LDL: 47, A1C: 5.9% improved. TSH: improved to 2.64  jardiance started and improved function, bun/cr stable on lasix/jardiance.  pt [facility]iance from [facility] as 15 dollars instead of 500 dollars.  pt denies cp, pt says sob going upstairs or going up the stairs. pt says bp 132/79 at [facility] physical. pt says lung is getting worse.

## 2024-06-24 NOTE — PHYSICAL EXAM
[Normal Venous Pressure] : normal venous pressure [Normal S1, S2] : normal S1, S2 [Clear Lung Fields] : clear lung fields [Soft] : abdomen soft [No Edema] : no edema [de-identified] : rrr

## 2024-06-24 NOTE — DISCUSSION/SUMMARY
[FreeTextEntry1] : pt with LVH, dilated lv, mild lv dysfunction lvh improving in 2024  ? due to hemochromatosis  continue furosemide 40 mg po qd as nyha class 2  continue synthroid 50 mcg po qd  consider pulmonary evaluation.  cont atorvastatin 10 mg po qhs  cont metoprolol er 25 qd  cont amlodipine 5 mg daily  cont valsartan 160 qd  on rebeca  continue levothyroxine to 75 mcg daily f/u with pmd  continue Jardiance 10 mg   carotid/bloodwork/6 months  repeat bp was 138/80

## 2024-07-01 ENCOUNTER — RX RENEWAL (OUTPATIENT)
Age: 79
End: 2024-07-01

## 2024-10-10 ENCOUNTER — RX RENEWAL (OUTPATIENT)
Age: 79
End: 2024-10-10

## 2024-11-07 ENCOUNTER — RX RENEWAL (OUTPATIENT)
Age: 79
End: 2024-11-07

## 2024-11-20 ENCOUNTER — APPOINTMENT (OUTPATIENT)
Dept: CARDIOLOGY | Facility: CLINIC | Age: 79
End: 2024-11-20
Payer: MEDICARE

## 2024-11-20 PROCEDURE — 93880 EXTRACRANIAL BILAT STUDY: CPT

## 2024-12-10 PROBLEM — E66.89 OTHER OBESITY: Status: ACTIVE | Noted: 2022-10-11

## 2024-12-11 ENCOUNTER — RX RENEWAL (OUTPATIENT)
Age: 79
End: 2024-12-11

## 2024-12-11 ENCOUNTER — NON-APPOINTMENT (OUTPATIENT)
Age: 79
End: 2024-12-11

## 2024-12-11 ENCOUNTER — APPOINTMENT (OUTPATIENT)
Dept: CARDIOLOGY | Facility: CLINIC | Age: 79
End: 2024-12-11
Payer: MEDICARE

## 2024-12-11 VITALS
HEART RATE: 79 BPM | RESPIRATION RATE: 14 BRPM | DIASTOLIC BLOOD PRESSURE: 70 MMHG | OXYGEN SATURATION: 96 % | SYSTOLIC BLOOD PRESSURE: 130 MMHG | WEIGHT: 260 LBS | BODY MASS INDEX: 35.21 KG/M2 | HEIGHT: 72 IN

## 2024-12-11 DIAGNOSIS — I51.9 HEART DISEASE, UNSPECIFIED: ICD-10-CM

## 2024-12-11 DIAGNOSIS — I65.29 OCCLUSION AND STENOSIS OF UNSPECIFIED CAROTID ARTERY: ICD-10-CM

## 2024-12-11 DIAGNOSIS — I10 ESSENTIAL (PRIMARY) HYPERTENSION: ICD-10-CM

## 2024-12-11 DIAGNOSIS — R93.1 ABNORMAL FINDINGS ON DIAGNOSTIC IMAGING OF HEART AND CORONARY CIRCULATION: ICD-10-CM

## 2024-12-11 DIAGNOSIS — I25.10 ATHEROSCLEROTIC HEART DISEASE OF NATIVE CORONARY ARTERY W/OUT ANGINA PECTORIS: ICD-10-CM

## 2024-12-11 DIAGNOSIS — I50.22 CHRONIC SYSTOLIC (CONGESTIVE) HEART FAILURE: ICD-10-CM

## 2024-12-11 DIAGNOSIS — E66.89 OTHER OBESITY NOT ELSEWHERE CLASSIFIED: ICD-10-CM

## 2024-12-11 PROCEDURE — 99214 OFFICE O/P EST MOD 30 MIN: CPT

## 2024-12-11 PROCEDURE — G2211 COMPLEX E/M VISIT ADD ON: CPT

## 2024-12-27 ENCOUNTER — RX RENEWAL (OUTPATIENT)
Age: 79
End: 2024-12-27

## 2025-03-11 ENCOUNTER — NON-APPOINTMENT (OUTPATIENT)
Age: 80
End: 2025-03-11

## 2025-06-09 ENCOUNTER — APPOINTMENT (OUTPATIENT)
Dept: CARDIOLOGY | Facility: CLINIC | Age: 80
End: 2025-06-09
Payer: MEDICARE

## 2025-06-09 PROCEDURE — 93306 TTE W/DOPPLER COMPLETE: CPT

## 2025-06-16 ENCOUNTER — APPOINTMENT (OUTPATIENT)
Dept: CARDIOLOGY | Facility: CLINIC | Age: 80
End: 2025-06-16
Payer: MEDICARE

## 2025-06-16 ENCOUNTER — NON-APPOINTMENT (OUTPATIENT)
Age: 80
End: 2025-06-16

## 2025-06-16 VITALS
WEIGHT: 260 LBS | BODY MASS INDEX: 30.08 KG/M2 | HEIGHT: 78 IN | HEART RATE: 80 BPM | SYSTOLIC BLOOD PRESSURE: 120 MMHG | DIASTOLIC BLOOD PRESSURE: 76 MMHG

## 2025-06-16 PROCEDURE — 99214 OFFICE O/P EST MOD 30 MIN: CPT

## 2025-06-16 PROCEDURE — G2211 COMPLEX E/M VISIT ADD ON: CPT

## 2025-06-16 PROCEDURE — 93000 ELECTROCARDIOGRAM COMPLETE: CPT

## 2025-07-24 ENCOUNTER — APPOINTMENT (OUTPATIENT)
Dept: PULMONOLOGY | Facility: CLINIC | Age: 80
End: 2025-07-24
Payer: MEDICARE

## 2025-07-24 VITALS
WEIGHT: 260 LBS | RESPIRATION RATE: 14 BRPM | OXYGEN SATURATION: 98 % | HEART RATE: 83 BPM | BODY MASS INDEX: 26.54 KG/M2 | SYSTOLIC BLOOD PRESSURE: 130 MMHG | DIASTOLIC BLOOD PRESSURE: 72 MMHG

## 2025-07-24 DIAGNOSIS — R06.02 SHORTNESS OF BREATH: ICD-10-CM

## 2025-07-24 DIAGNOSIS — R93.89 ABNORMAL FINDINGS ON DIAGNOSTIC IMAGING OF OTHER SPECIFIED BODY STRUCTURES: ICD-10-CM

## 2025-07-24 PROCEDURE — G2211 COMPLEX E/M VISIT ADD ON: CPT

## 2025-07-24 PROCEDURE — 99204 OFFICE O/P NEW MOD 45 MIN: CPT

## 2025-08-01 ENCOUNTER — APPOINTMENT (OUTPATIENT)
Dept: PULMONOLOGY | Facility: HOSPITAL | Age: 80
End: 2025-08-01

## 2025-08-01 ENCOUNTER — OUTPATIENT (OUTPATIENT)
Dept: OUTPATIENT SERVICES | Facility: HOSPITAL | Age: 80
LOS: 1 days | End: 2025-08-01

## 2025-08-01 DIAGNOSIS — Z98.49 CATARACT EXTRACTION STATUS, UNSPECIFIED EYE: Chronic | ICD-10-CM

## 2025-08-01 DIAGNOSIS — Z96.652 PRESENCE OF LEFT ARTIFICIAL KNEE JOINT: Chronic | ICD-10-CM

## 2025-08-01 DIAGNOSIS — R06.02 SHORTNESS OF BREATH: ICD-10-CM

## 2025-08-01 PROCEDURE — 94060 EVALUATION OF WHEEZING: CPT | Mod: 26

## 2025-08-01 PROCEDURE — 94729 DIFFUSING CAPACITY: CPT | Mod: 26

## 2025-08-01 PROCEDURE — 94727 GAS DIL/WSHOT DETER LNG VOL: CPT | Mod: 26

## 2025-08-02 DIAGNOSIS — R06.02 SHORTNESS OF BREATH: ICD-10-CM
